# Patient Record
Sex: MALE | Race: WHITE
[De-identification: names, ages, dates, MRNs, and addresses within clinical notes are randomized per-mention and may not be internally consistent; named-entity substitution may affect disease eponyms.]

---

## 2020-05-20 ENCOUNTER — HOSPITAL ENCOUNTER (EMERGENCY)
Dept: HOSPITAL 75 - ER FS | Age: 25
Discharge: HOME | End: 2020-05-20
Payer: SELF-PAY

## 2020-05-20 VITALS — BODY MASS INDEX: 29.22 KG/M2 | WEIGHT: 220.46 LBS | HEIGHT: 72.99 IN

## 2020-05-20 VITALS — SYSTOLIC BLOOD PRESSURE: 127 MMHG | DIASTOLIC BLOOD PRESSURE: 84 MMHG

## 2020-05-20 DIAGNOSIS — L03.011: ICD-10-CM
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
## 2020-05-20 DIAGNOSIS — S60.361A: Primary | ICD-10-CM

## 2020-05-20 PROCEDURE — 99283 EMERGENCY DEPT VISIT LOW MDM: CPT

## 2020-05-20 NOTE — XMS REPORT
Continuity of Care Document

                             Created on: 2020



JENELLE MERCADO

External Reference #: [id number]

: 1995

Sex: Male



Demographics





                          Address                   [address]

[place]  [address]

 

                          Home Phone                [phone] x

 

                          Preferred Language        Unknown

 

                          Marital Status            Unknown

 

                          Worship Affiliation     Unknown

 

                          Race                      Unknown

 

                          Ethnic Group              Unknown





Author





                          Organization              Unknown

 

                          Address                   Unknown

 

                          Phone                     Unavailable



              



Allergies

      



There is no data.                  



Medications

      



There is no data.                  



Problems

      



There is no data.                  



Procedures

      



There is no data.                  



Results

      



There is no data.              



Encounters

      



                ACCT No.           Visit Date/Time           Discharge          

 Status         

             Pt. Type           Provider           Facility           Loc./Unit 

          

Complaint        

 

                    [id number]           2020 19:06:00           

020 19:24:00        

                DIS             Emergency           BHARATH MORALES, LIBAN MURPHY        

   [facility]           ER FS                     SPIDER BITE ON HAND

## 2020-05-20 NOTE — ED GENERAL
General


Chief Complaint:  Bite-Animal/Human/Insect


Stated Complaint:  SPIDER BITE ON HAND


Source of Information:  Patient, RN/MD


Exam Limitations:  No Limitations





History of Present Illness


Date Seen by Provider:  May 20, 2020


Time Seen by Provider:  19:00


Initial Comments


This patient is a 24-year-old male presents to the emergency department for an 

insect bite at the base of the right thumb. Patient states been affected swollen

have drainage for the past couple hours. Patient states seems to be getting 

worse. Patient denies fever.


Timing/Duration:  3-4 Days


Severity:  Mild


Associated Systoms:  No Fever/Chills





Allergies and Home Medications


Patient Home Medication List


Home Medication List Reviewed:  Yes





Review of Systems


Review of Systems


Constitutional:  No no symptoms reported; see HPI; No chills, No diaphoresis, No

dizziness, No fever, No malaise, No weakness, No weight gain, No weight loss, No

other


EENTM:  No see HPI, No no symptoms reported, No ear discharge, No hearing loss, 

No ear pain, No blurred vision, No double vision, No eye pain, No tearing, No 

vision loss, No dental problems, No hoarseness, No mouth pain, No mouth 

swelling, No epistaxis, No nose congestion, No nose pain, No throat pain, No 

throat swelling, No other


Respiratory:  No no symptoms reported, No see HPI, No cough, No dyspnea on 

exertion, No hemoptysis, No orthopnea, No phlegm, No short of breath, No 

stridor, No wheezing, No other


Cardiovascular:  No no symptoms reported, No see HPI, No chest pain, No edema, 

No Hx of Intervention, No palpitations, No syncope, No vascular heart diseas, No

other


Gastrointestinal:  No RUQ, No LUQ, No RLQ, No LLQ, No no symptoms reported, No 

see HPI, No abdominal pain, No constipation, No diarrhea, No dysphagia, No 

hematemesis, No heartburn, No jaundice, No loss of appetite, No melena, No 

nausea, No vomiting, No other


Genitourinary:  No no symptoms reported, No see HPI, No decreased output, No 

discharge, No dysuria, No frequency, No hematuria, No hesitancy, No inc

ontinence, No nocturia, No pain, No other


Musculoskeletal:  No no symptoms reported, No see HPI, No back pain, No gout, No

joint pain, No joint swelling, No muscle pain, No muscle stiffness, No muscle 

cramps, No muscle twitching, No muscle weakness, No neck pain, No other


Skin:  No no symptoms reported; see HPI, change in color; No change in 

hair/nails, No dryness, No hx of skin cancer, No lesions, No lumps, No pruritus,

No rash, No other


Psychiatric/Neurological:  Denies No Symptoms Reported, Denies See HPI, Denies 

Anxiety, Denies Depressed, Denies Emotional Problems, Denies Headache, Denies 

Numbness, Denies Paresthesia, Denies Pre-Existing Deficit, Denies Seizure, 

Denies Tingling, Denies Tremors, Denies Weakness, Denies Other





All Other Systems Reviewed


Negative Unless Noted:  Yes





Past Medical-Social-Family Hx


Patient Social History


Recent Foreign Travel:  No


Contact w/Someone Who Travel:  No





Physical Exam


Vital Signs


Capillary Refill :


Height, Weight, BMI


Height: '"


Weight: lbs. oz. kg;  BMI


Method:


General Appearance:  No Apparent Distress, WD/WN


HEENT:  PERRL/EOMI, TMs Normal, Normal ENT Inspection, Pharynx Normal


Neck:  Full Range of Motion, Normal Inspection, Non Tender, Supple, Carotid 

Bruit


Respiratory:  Chest Non Tender, Lungs Clear, Normal Breath Sounds, No Accessory 

Muscle Use, No Respiratory Distress


Cardiovascular:  Regular Rate, Rhythm, No Edema, No Gallop, No JVD, No Murmur, 

Normal Peripheral Pulses


Extremity:  Inflammation, Swelling (the base of the right thumb. Patient has a 

infected insect bite with mild cellulitis.)





Progress/Results/Core Measures


Suspected Sepsis


SIRS


Temperature: 


Pulse:  


Respiratory Rate: 


 


Blood Pressure  / 


Mean:





Results/Orders


Vital Signs/I&O


Capillary Refill :


Progress Note :  


   Time:  19:13


Progress Note


Soap in Epsom salts and water twice daily for at least 30 minutes. Used Delmar

about appointment as instructed. Finish all antibodies. Ice as needed Tylenol 

Motrin as needed. Follow-up with PCP in 2-3 days.





Departure


Impression





   Primary Impression:  


   Insect bites


   Additional Impression:  


   Cellulitis


Disposition:  01 HOME, SELF-CARE


Condition:  Stable





Departure-Patient Inst.


Decision time for Depature:  19:14


Referrals:  


NO,LOCAL PHYSICIAN (PCP)


Primary Care Physician


Patient Instructions:  Cellulitis (Skin Infection), Adult (DC)





Add. Discharge Instructions:  


Soap in Epsom salts and water twice daily for at least 30 minutes. Used Triple 

Antibiotic appointment as instructed. Finish all antibodies. Ice as needed 

Tylenol Motrin as needed. Follow-up with PCP in 2-3 days.





All discharge instructions reviewed with patient and/or family. Voiced unders

tanding.


Scripts


Sulfamethoxazole/Trimethoprim (Bactrim Ds Tablet) 1 Each Tablet


1 EACH PO BID for 10 Days, #20 TAB


   Prov: LIBAN SINHA MD         5/20/20











LIBAN SINHA MD            May 20, 2020 19:15

## 2020-05-23 ENCOUNTER — HOSPITAL ENCOUNTER (EMERGENCY)
Dept: HOSPITAL 75 - ER FS | Age: 25
Discharge: ANOTHER HOSPITAL | End: 2020-05-23
Payer: SELF-PAY

## 2020-05-23 VITALS — DIASTOLIC BLOOD PRESSURE: 83 MMHG | SYSTOLIC BLOOD PRESSURE: 144 MMHG

## 2020-05-23 DIAGNOSIS — T81.41XA: Primary | ICD-10-CM

## 2020-05-23 LAB
ALBUMIN SERPL-MCNC: 4.8 GM/DL (ref 3.2–4.5)
ALP SERPL-CCNC: 68 U/L (ref 40–136)
ALT SERPL-CCNC: 17 U/L (ref 0–55)
BASOPHILS # BLD AUTO: 0 10^3/UL (ref 0–0.1)
BASOPHILS NFR BLD AUTO: 0 % (ref 0–10)
BILIRUB SERPL-MCNC: 0.7 MG/DL (ref 0.1–1)
BUN/CREAT SERPL: 12
CALCIUM SERPL-MCNC: 9.8 MG/DL (ref 8.5–10.1)
CHLORIDE SERPL-SCNC: 98 MMOL/L (ref 98–107)
CO2 SERPL-SCNC: 26 MMOL/L (ref 21–32)
CREAT SERPL-MCNC: 0.82 MG/DL (ref 0.6–1.3)
EOSINOPHIL # BLD AUTO: 0 10^3/UL (ref 0–0.3)
EOSINOPHIL NFR BLD AUTO: 0 % (ref 0–10)
ERYTHROCYTE [DISTWIDTH] IN BLOOD BY AUTOMATED COUNT: 11.9 % (ref 10–14.5)
GFR SERPLBLD BASED ON 1.73 SQ M-ARVRAT: > 60 ML/MIN
GLUCOSE SERPL-MCNC: 97 MG/DL (ref 70–105)
HCT VFR BLD CALC: 43 % (ref 40–54)
HGB BLD-MCNC: 15.1 G/DL (ref 13.3–17.7)
LYMPHOCYTES # BLD AUTO: 1.7 X 10^3 (ref 1–4)
LYMPHOCYTES NFR BLD AUTO: 11 % (ref 12–44)
MANUAL DIFFERENTIAL PERFORMED BLD QL: YES
MCH RBC QN AUTO: 31 PG (ref 25–34)
MCHC RBC AUTO-ENTMCNC: 35 G/DL (ref 32–36)
MCV RBC AUTO: 88 FL (ref 80–99)
MONOCYTES # BLD AUTO: 1.3 X 10^3 (ref 0–1)
MONOCYTES NFR BLD AUTO: 8 % (ref 0–12)
MONOCYTES NFR BLD: 5 %
NEUTROPHILS # BLD AUTO: 12.6 X 10^3 (ref 1.8–7.8)
NEUTROPHILS NFR BLD AUTO: 80 % (ref 42–75)
NEUTS BAND NFR BLD MANUAL: 83 %
PLATELET # BLD: 257 10^3/UL (ref 130–400)
PMV BLD AUTO: 9.7 FL (ref 7.4–10.4)
POTASSIUM SERPL-SCNC: 3.4 MMOL/L (ref 3.6–5)
PROT SERPL-MCNC: 8.1 GM/DL (ref 6.4–8.2)
SODIUM SERPL-SCNC: 138 MMOL/L (ref 135–145)
VARIANT LYMPHS NFR BLD MANUAL: 12 %
WBC # BLD AUTO: 15.8 10^3/UL (ref 4.3–11)

## 2020-05-23 PROCEDURE — 73130 X-RAY EXAM OF HAND: CPT

## 2020-05-23 PROCEDURE — 87205 SMEAR GRAM STAIN: CPT

## 2020-05-23 PROCEDURE — 87077 CULTURE AEROBIC IDENTIFY: CPT

## 2020-05-23 PROCEDURE — 87070 CULTURE OTHR SPECIMN AEROBIC: CPT

## 2020-05-23 PROCEDURE — 85027 COMPLETE CBC AUTOMATED: CPT

## 2020-05-23 PROCEDURE — 87186 SC STD MICRODIL/AGAR DIL: CPT

## 2020-05-23 PROCEDURE — 36415 COLL VENOUS BLD VENIPUNCTURE: CPT

## 2020-05-23 PROCEDURE — 83605 ASSAY OF LACTIC ACID: CPT

## 2020-05-23 PROCEDURE — 87040 BLOOD CULTURE FOR BACTERIA: CPT

## 2020-05-23 PROCEDURE — 80053 COMPREHEN METABOLIC PANEL: CPT

## 2020-05-23 PROCEDURE — 85007 BL SMEAR W/DIFF WBC COUNT: CPT

## 2020-05-23 NOTE — DIAGNOSTIC IMAGING REPORT
INDICATION:  Infected swollen painful hand



TECHNIQUE:  Three views of the right hand.



CORRELATION STUDY:  None



FINDINGS: 

There is normal alignment and appearance of the osseous

structures of the hand. The joint spaces are maintained. There is

no acute fracture.   Generalized soft tissue edema over the hand

and distal forearm.



IMPRESSION: 

1. Negative for acute bony abnormality of the hand.  Generalized

soft tissue edema.



Dictated by: 



  Dictated on workstation # [id number]
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
## 2020-09-06 ENCOUNTER — HOSPITAL ENCOUNTER (EMERGENCY)
Dept: HOSPITAL 75 - ER FS | Age: 25
Discharge: HOME | End: 2020-09-06
Payer: SELF-PAY

## 2020-09-06 VITALS — DIASTOLIC BLOOD PRESSURE: 72 MMHG | SYSTOLIC BLOOD PRESSURE: 131 MMHG

## 2020-09-06 VITALS — WEIGHT: 191.8 LBS | HEIGHT: 72.83 IN | BODY MASS INDEX: 25.42 KG/M2

## 2020-09-06 DIAGNOSIS — L03.116: Primary | ICD-10-CM

## 2020-09-06 PROCEDURE — 99284 EMERGENCY DEPT VISIT MOD MDM: CPT

## 2020-09-06 NOTE — ED GENERAL
General


Stated Complaint:  INSECT BITE ON LT LEG





History of Present Illness


Date Seen by Provider:  Sep 6, 2020


Time Seen by Provider:  16:15


Initial Comments


The patient is a 24-year-old male who is otherwise healthy. He presents with 

concern for an erythematous tender lesion over his left lateral hip at the level

of the greater trochanter. He states he awoke with this 2 days ago and it has 

been getting bigger ever since. He states he tried to cut into it to "drain it" 

but got nothing out. He denies associated fevers, nausea or vomiting, abdominal 

pain, flank pain, back pain, groin pain, pain with movement of his left hip. He 

ambulated in with a narrow, steady gait. No therapy prior to arrival.





Allergies and Home Medications


Allergies


Coded Allergies:  


     No Known Drug Allergies (Unverified , 5/20/20)





Home Medications


Sulfamethoxazole/Trimethoprim 1 Each Tablet, 1 EACH PO BID


   Prescribed by: LIBAN SINHA on 5/20/20 1915





Patient Home Medication List


Home Medication List Reviewed:  Yes





Review of Systems


Review of Systems


Constitutional:  see HPI





All Other Systems Reviewed


Negative Unless Noted:  Yes (Negative excepted noted.)





Past Medical-Social-Family Hx


Past Med/Social Hx:  Reviewed Nursing Past Med/Soc Hx


Patient Social History


2nd Hand Smoke Exposure:  No


Recent Hopitalizations:  No





Seasonal Allergies


Seasonal Allergies:  No





Past Medical History


Surgeries:  No


Respiratory:  No


Cardiac:  No


Neurological:  No


Genitourinary:  No


Gastrointestinal:  No


Musculoskeletal:  No


Endocrine:  No


HEENT:  No


Cancer:  No


Psychosocial:  No


Integumentary:  No


Blood Disorders:  No





Family Medical History


Reviewed Nursing Family Hx





Physical Exam


Vital Signs


Capillary Refill :


Height, Weight, BMI


Height: '"


Weight: lbs. oz. kg; 29.00 BMI


Method:


General Appearance:  No Apparent Distress


Comments


This is a young  male appearing nontoxic and in no acute distress. Head

is normocephalic and atraumatic. Neck is supple and nontender. Oropharynx is 

moist. Lungs are clear to auscultation at all stations. There is a normal S1 and

S2 without rubs or gallops and capillary refill is appropriate, less than 2 

seconds globally. Abdomen is soft, nontender and nondistended. Skin is warm and 

dry without cyanosis, clubbing or edema. Psychiatrically, the patient didn't 

straights appropriate mood and affect and is alert. From a musculoskeletal st

andpoint, evaluation of the left lower extremity is remarkable for an 

approximately 3 cm raised erythematous mildly tender lesion without fluctuance 

noted to the upper left lateral thigh over the greater trochanter. No pain with 

ranging of any joint of the left lower extremity. The left lower cavity is 

neurovascularly intact distally with strength 5 out of 5, sensation intact to li

ght touch in all nerve distortions, DP and PT pulses 2+, capillary refill less 

than 2 seconds, 4 warm and well-perfused.





Progress/Results/Core Measures


Suspected Sepsis


SIRS


Temperature: 


Pulse:  


Respiratory Rate: 


 


Blood Pressure  / 


Mean:





Results/Orders


My Orders





Orders - MAGALIS JOHNSON MD


Cephalexin Capsule (Keflex Capsule) (9/6/20 17:30)


Sulfamethoxazole/Trimet Ds Tab (Bactrim (9/6/20 17:30)


Ibuprofen  Tablet (Motrin  Tablet) (9/6/20 17:30)





Vital Signs/I&O


Capillary Refill :


Progress Note :  


   Time:  17:21


Progress Note


Bedside soft tissue ultrasound utilized to evaluate the apparent focal 

cellulitis versus abscess noted to the patient's left hip. This shows 

cobblestoning but no focal fluid collection amenable to abscess I&D. No joint 

irritability seen; no evidence of any joint space involvement. Left lower 

extremity is neurovascularly intact. No history of IV drug use. We'll place on 

Keflex and Bactrim and discharge with strict return precautions for worsening 

symptoms of any kind. We'll prescribe pain medication. All questions are 

answered.





Departure


Impression





   Primary Impression:  


   Cellulitis of left hip


Disposition:  01 HOME, SELF-CARE


Condition:  Improved





Departure-Patient Inst.


Referrals:  


HENRIETTA ROMO MD


Patient Instructions:  Cellulitis and Erysipelas (Skin Infections)





Add. Discharge Instructions:  


Follow-up very closely with your primary care physician; we are referring you to

see Dr. self. Please call to make an appointment immediately after the long 

weekend. Take the antibiotics until they are gone. Take ibuprofen every 8 hours 

as needed for pain, with food to prevent stomach upset. You may take a Norco 

pill every 6 hours as needed for pain that is not well controlled with ibuprofen

alone. Return to the emergency department right away with worsening symptoms of 

any kind or with any other new symptoms of concern.


Scripts


Hydrocodone/Acetaminophen (Hydrocodone-Acetamin 5-325 mg) 1 Each Tablet


1 EACH PO Q6H for Breakthrough Pain, #10 TAB


   Prov: MAGALIS JOHNSON MD         9/6/20 


Ibuprofen (Ibuprofen) 800 Mg Tablet


800 MG PO Q8H PRN for PAIN, #30 TAB 0 Refills


   Prov: MAGALIS JOHNSON MD         9/6/20 


Sulfamethoxazole/Trimethoprim (Bactrim Ds Tablet) 1 Each Tablet


1 EACH PO BID for 10 Days, #20 TAB


   Prov: MAGALIS JOHNSON MD         9/6/20 


Cephalexin (Keflex) 500 Mg Capsule


500 MG PO Q6H for 10 Days, #40 CAP


   Prov: MAGALIS JOHNSON MD         9/6/20











MAGALIS JOHNSON MD               Sep 6, 2020 17:23

## 2023-06-29 ENCOUNTER — HOSPITAL ENCOUNTER (OUTPATIENT)
Dept: HOSPITAL 75 - ER FS | Age: 28
Discharge: HOME | End: 2023-06-29
Attending: SURGERY
Payer: SELF-PAY

## 2023-06-29 VITALS — SYSTOLIC BLOOD PRESSURE: 160 MMHG | DIASTOLIC BLOOD PRESSURE: 97 MMHG

## 2023-06-29 VITALS — WEIGHT: 224.87 LBS | HEIGHT: 71.65 IN | BODY MASS INDEX: 30.79 KG/M2

## 2023-06-29 VITALS — SYSTOLIC BLOOD PRESSURE: 150 MMHG | DIASTOLIC BLOOD PRESSURE: 96 MMHG

## 2023-06-29 VITALS — SYSTOLIC BLOOD PRESSURE: 167 MMHG | DIASTOLIC BLOOD PRESSURE: 104 MMHG

## 2023-06-29 VITALS — SYSTOLIC BLOOD PRESSURE: 168 MMHG | DIASTOLIC BLOOD PRESSURE: 93 MMHG

## 2023-06-29 VITALS — SYSTOLIC BLOOD PRESSURE: 155 MMHG | DIASTOLIC BLOOD PRESSURE: 97 MMHG

## 2023-06-29 VITALS — DIASTOLIC BLOOD PRESSURE: 91 MMHG | SYSTOLIC BLOOD PRESSURE: 137 MMHG

## 2023-06-29 VITALS — SYSTOLIC BLOOD PRESSURE: 165 MMHG | DIASTOLIC BLOOD PRESSURE: 105 MMHG

## 2023-06-29 VITALS — DIASTOLIC BLOOD PRESSURE: 98 MMHG | SYSTOLIC BLOOD PRESSURE: 162 MMHG

## 2023-06-29 DIAGNOSIS — K35.32: Primary | ICD-10-CM

## 2023-06-29 LAB
ALBUMIN SERPL-MCNC: 4.4 GM/DL (ref 3.2–4.5)
ALP SERPL-CCNC: 112 U/L (ref 40–136)
ALT SERPL-CCNC: 82 U/L (ref 0–55)
APTT PPP: (no result) S
BACTERIA #/AREA URNS HPF: NEGATIVE /HPF
BASOPHILS # BLD AUTO: 0 10^3/UL (ref 0–0.1)
BASOPHILS NFR BLD AUTO: 0 % (ref 0–10)
BILIRUB SERPL-MCNC: 0.8 MG/DL (ref 0.1–1)
BILIRUB UR QL STRIP: (no result)
BUN/CREAT SERPL: 13
CALCIUM SERPL-MCNC: 9.8 MG/DL (ref 8.5–10.1)
CHLORIDE SERPL-SCNC: 98 MMOL/L (ref 98–107)
CO2 SERPL-SCNC: 26 MMOL/L (ref 21–32)
CREAT SERPL-MCNC: 0.94 MG/DL (ref 0.6–1.3)
EOSINOPHIL # BLD AUTO: 0.1 10^3/UL (ref 0–0.3)
EOSINOPHIL NFR BLD AUTO: 1 % (ref 0–10)
FIBRINOGEN PPP-MCNC: CLEAR MG/DL
GFR SERPLBLD BASED ON 1.73 SQ M-ARVRAT: 114 ML/MIN
GLUCOSE SERPL-MCNC: 113 MG/DL (ref 70–105)
GLUCOSE UR STRIP-MCNC: NEGATIVE MG/DL
HCT VFR BLD CALC: 43 % (ref 40–54)
HGB BLD-MCNC: 14.8 G/DL (ref 13.3–17.7)
KETONES UR QL STRIP: NEGATIVE
LEUKOCYTE ESTERASE UR QL STRIP: NEGATIVE
LIPASE SERPL-CCNC: 22 U/L (ref 8–78)
LYMPHOCYTES # BLD AUTO: 1.3 10^3/UL (ref 1–4)
LYMPHOCYTES NFR BLD AUTO: 11 % (ref 12–44)
MANUAL DIFFERENTIAL PERFORMED BLD QL: NO
MCH RBC QN AUTO: 30 PG (ref 25–34)
MCHC RBC AUTO-ENTMCNC: 34 G/DL (ref 32–36)
MCV RBC AUTO: 89 FL (ref 80–99)
MONOCYTES # BLD AUTO: 0.9 10^3/UL (ref 0–1)
MONOCYTES NFR BLD AUTO: 8 % (ref 0–12)
NEUTROPHILS # BLD AUTO: 9.8 10^3/UL (ref 1.8–7.8)
NEUTROPHILS NFR BLD AUTO: 81 % (ref 42–75)
NITRITE UR QL STRIP: NEGATIVE
PH UR STRIP: 6 [PH] (ref 5–9)
PLATELET # BLD: 292 10^3/UL (ref 130–400)
PMV BLD AUTO: 9.7 FL (ref 9–12.2)
POTASSIUM SERPL-SCNC: 3.9 MMOL/L (ref 3.6–5)
PROT SERPL-MCNC: 8.2 GM/DL (ref 6.4–8.2)
PROT UR QL STRIP: (no result)
RBC #/AREA URNS HPF: (no result) /HPF
SODIUM SERPL-SCNC: 135 MMOL/L (ref 135–145)
SP GR UR STRIP: 1.02 (ref 1.02–1.02)
SQUAMOUS #/AREA URNS HPF: (no result) /HPF
WBC # BLD AUTO: 12.1 10^3/UL (ref 4.3–11)
WBC #/AREA URNS HPF: (no result) /HPF

## 2023-06-29 PROCEDURE — 80053 COMPREHEN METABOLIC PANEL: CPT

## 2023-06-29 PROCEDURE — 81000 URINALYSIS NONAUTO W/SCOPE: CPT

## 2023-06-29 PROCEDURE — 85025 COMPLETE CBC W/AUTO DIFF WBC: CPT

## 2023-06-29 PROCEDURE — 83690 ASSAY OF LIPASE: CPT

## 2023-06-29 PROCEDURE — 87081 CULTURE SCREEN ONLY: CPT

## 2023-06-29 PROCEDURE — 36415 COLL VENOUS BLD VENIPUNCTURE: CPT

## 2023-06-29 PROCEDURE — 74177 CT ABD & PELVIS W/CONTRAST: CPT

## 2023-06-29 NOTE — DISCHARGE INST-SURGICAL
Discharge Inst-Surgical


Depart Medication/Instructions


New, Converted or Re-Newed RX:  Transmitted to Pharmacy


Patient Instructions


Follow up Appt:


Make appointment for 1 week. [phone]





Instructions:


No lifting greater than 20 pounds.


No strenuous activity. 


May shower in 24 hours, no tub bath or soaking.


Use incentive spirometer at home as directed.


No Smoking





Skin/Wound Care:


May remove bandages in am.  You need to leave the Dermabond on incision it will 

fall off on it's own. 





Symptoms to Report:


Appetite Changes, Extremity Discoloration, Numbness/Tingling, Swelling 

Increased, Bleeding Excessive, Eyesight Changes, Pain Increased, Urine Color 

Change, Constipation(Persistent), Fever over 101 degree F, Pain/Pressure in 

chest, Urinating Difficulty, Cough Up/Vomit Blood, Heart Beat Irreg/Pounding, 

Pain/Pressure in jaw, Cramps in feet or legs, Lightheadedness, Pain/Pressure in 

shoulder, Diarrhea(Persistent), Memory Changes Suddenly, Questions/Concerns, 

Weight gain consecutive days, Dizziness/Fainting, Nausea/Vomiting, Shortness of 

Breath, Weight gain over 2 pounds








If questions or concerns contact your physician 


Or seek help at emergency department.





Activity


Activity as Tolerated:  Yes


Activity Instructions:  Avoid Stress to Incision


Driving Instructions:  No Driving/Refer to Dr. Zuleta


Discharge Diet:  No Restrictions


Diet After 24 Hours:  Clear Liquid if Nauseous


If Any Problems/Questions/Issu:  Contact Your Physician, Go to Emergency Room





Skin/Wound Care


Infection Signs and Symptoms:  Increased Redness, Foul Odor of Wound, Increased 

Drainage, Skin Itchy or Has a Rash, Increased Swelling, Temperature Above 101  F


Wound Care Comment:  


Heating pad to shoulder or neck tonight for pain.  YULISSA bulb teaching


Bathing Instructions:  Shower


Stitches/Staples/Dermabond Dis:  Dermabond











SHREYAS BAEZ DO               Jun 29, 2023 19:36

## 2023-06-29 NOTE — DIAGNOSTIC IMAGING REPORT
PROCEDURE: CT abdomen and pelvis with contrast.



TECHNIQUE: Multiple contiguous axial images were obtained through

the abdomen and pelvis after administration of intravenous

contrast. Auto Exposure Controls were utilized during the CT exam

to meet ALARA standards for radiation dose reduction. All CT

scans use one or more of the following dose optimizing

techniques: automated exposure control, MA and/or KvP adjustment

based on patient size and exam type or iterative reconstruction.



INDICATION: Right lower quadrant abdominal pain.



FINDINGS: No focal hepatic, gallbladder, pancreatic, adrenal

gland or splenic abnormalities identified. Kidneys are also

unremarkable.



There is extensive dilatation and mural thickening of the

appendix located in a retrocecal location. Surrounding edema and

inflammation is also noted without evidence of organized fluid

collection. Otherwise, no focal inflammation or organized fluid

collection is seen within the abdomen or pelvis. There is

exostosis along the lateral aspect of the anterosuperior right

iliac bone without CT evidence of cartilaginous thickening. This

measures up to approximately 3.2 cm in diameter.



IMPRESSION: Findings are compatible with acute appendicitis with

periappendiceal inflammation. No drainable abscess is identified.



Incidental note is made of 3.2 cm external osteochondroma along

the anterosuperior right iliac bone.



Dictated by: 



  Dictated on workstation # [id number]

## 2023-06-29 NOTE — PROGRESS NOTE-POST OPERATIVE
Post-Operative Progess Note


Surgeon (s)/Assistant (s)


Surgeon


SHREYAS BAEZ DO


Assistant:  MISTY Valadez





Pre-Operative Diagnosis


Appendicitis





Post-Operative Diagnosis





Perforated Appendicitis





Procedure & Operative Findings


Date of Procedure


6/29/23


Procedure Performed/Findings


PROCEDURE: Laparoscopic appendectomy. 


 


COMPLICATIONS: None. 


 


INDICATIONS:


The patient is a 27 year old male who has been having right lower


quadrant abdominal pain. Patient's exam consistent with appendicitis.


I discussed risk and benefits of laparoscopic appendectomy 


and all indicated procedures with the possibility being a normal appendix. 


The patient understands the risks and benefits and wishes to proceed. 


Consent was signed on the chart. 


 


DESCRIPTION OF PROCEDURE:


The patient was taken to the operating suite, prepped and draped in a sterile fa

shion.  Timeout was 


performed.  Local anesthetic was infiltrated just above the umbilicus and 11-

blade scalpel was used 


to make a skin incision.  Cautery was used to dissect down to the fascia and 

scored.  Kochers were 


used to grasp and elevate it and the abdomen was then entered.  A 0 Vicryl was 

placed in a figure-of-


eight fashion for closure at the end of the case.  The balloon trocar was 

inserted into the abdomen and 


pneumoperitoneum was achieved.  Under direct visualization of the laparoscope, a

5 mm trocar was 


placed in the suprapubic region and a 5 mm trocar was placed in the left lower 

quadrant.  Appendix was


located, behind the cecum and up more in the right upper quadrant.  Started 

gently but bluntly dissecting


it off the wall and immediately got some purulent fluid out.  It was very stuck 

and had a hard time getting


it free.  Actually had to place another 5mm port in the normal fashion in the 

right upper quadrant. Then


able to get around the base of the appendix right at the cecum.  Used an Endo-

SAURABH 2.5 stapler to fire 


across the base of the appendix.  The mesoappendix was then divided with the 

ligasure and more blunt


dissection as well as hydrodissection.  Finally able to get it completely free. 

It was then placed in an 


Endobag and removed through the 12 mm trocar site.  The abdomen was then 

irrigated and suctioned.  


Elected to place a 19French mavis drain into the abdomen and in the right 

paracolic gutter, brought out 


through the suprapubic port site and sutured in place with 3-0 nylon.  No other 

pathology noted.  The 


abdomen was then desufflated and the trocars were removed.  The 0 Vicryl placed 

at the beginning of the 


case was then tied closing the 12 mm fascial defect.  The skin was then closed 

using 4-0 Monocryl in a 


subcuticular fashion.  The abdomen was then washed and dried and Skin Affix was 

placed over the incisions.  


The mavis drain was hooked up to bulb suction.  Patient tolerated the procedure 

well without any comp-


lications and was taken to the recovery room in stable condition.


Anesthesia Type


GET





Estimated Blood Loss


Estimated blood loss (mL):  minimal





Specimens/Packing


Specimens Removed


appendix











SHREYAS BAEZ DO               Jun 29, 2023 19:33

## 2023-06-29 NOTE — ED ABDOMINAL PAIN
General


Chief Complaint:  Abdominal/GI Problems


Stated Complaint:  ABD PAIN; FEVER


Nursing Triage Note:  


Pt sent from [facility] for further evaluation and treatment of RLQ abdominal pain. Pt 


reports onset of pain approximately three days ago. Reports nausea/vomiting x1 


last night and night sweats. Pt denies complaints of diarrhea


Source of Information:  Patient


Exam Limitations:  No Limitations





History of Present Illness


Date Seen by Provider:  Jun 29, 2023


Time Seen by Provider:  14:08


Initial Comments


27-year-old male presents emergency department today for abdominal pain.  

Symptoms present for the last 2 or 3 days and have been constant.  He points to 

his right mid abdomen as the source of the pain.  No obvious aggravating or 

alleviating factors and no radiation.  He does endorse has had night sweats last

couple nights and thinks he may have had fevers but did not actually check his 

temperature.  He did vomit once today and he has had decreased appetite for the 

last couple of days.  He has had normal bowel movements and no urinary symptoms.

 No testicular or groin symptoms.  He was sent here from the [facility] clinic and I 

received verbal report prior to his arrival from the provider there.





All other systems reviewed and negative except documented per HPI.





Voice recognition software was used to help create this chart





Allergies and Home Medications


Allergies


Coded Allergies:  


     No Known Drug Allergies (Unverified , 5/20/20)





Patient Home Medication List


Home Medication List Reviewed:  Yes


Cephalexin (Keflex) 500 Mg Capsule, 500 MG PO Q6H


   Prescribed by: MAGALIS JOHNSON on 9/6/20 1728


Hydrocodone/Acetaminophen (Hydrocodone-Acetamin 5-325 mg) 1 Each Tablet, 1 EACH 

PO Q6H


   Prescribed by: MAGALIS JOHNSON on 9/6/20 1728


Ibuprofen (Ibuprofen) 800 Mg Tablet, 800 MG PO Q8H PRN for PAIN


   Prescribed by: MAGALIS JOHNSON on 9/6/20 1728


Sulfamethoxazole/Trimethoprim (Bactrim Ds Tablet) 1 Each Tablet, 1 EACH PO BID


   Prescribed by: LIBAN SINHA on 5/20/20 1915


Sulfamethoxazole/Trimethoprim (Bactrim Ds Tablet) 1 Each Tablet, 1 EACH PO BID


   Prescribed by: MAGALIS JOHNSON on 9/6/20 1728





Review of Systems


Review of Systems


Constitutional:  see HPI





Past Medical-Social-Family Hx


Patient Social History


Tobacco Use?:  No


Use of E-Cig and/or Vaping dev:  No


Substance use?:  No


Alcohol Use?:  Yes


Alcohol type:  Beer


Alcohol Frequency:  Once in a while





Seasonal Allergies


Seasonal Allergies:  No





Past Medical History


Surgeries:  No


Respiratory:  No


Cardiac:  No


Neurological:  No


Genitourinary:  No


Gastrointestinal:  No


Musculoskeletal:  No


Endocrine:  No


HEENT:  No


Cancer:  No


Psychosocial:  No


Integumentary:  No


Blood Disorders:  No





Physical Exam


Vital Signs





Vital Signs - First Documented








 6/29/23





 14:09


 


Temp 37.0


 


Pulse 88


 


Resp 16


 


B/P (MAP) 154/88 (110)


 


Pulse Ox 98


 


O2 Delivery Room Air





Capillary Refill : Less Than 3 Seconds


Height/Weight/BMI


Height: '"


Weight: lbs. oz. kg; 30.00 BMI


Method:


General Appearance:  WD/WN, no apparent distress


HEENT:  normal ENT inspection, pharynx normal


Neck:  non-tender, supple, normal inspection


Respiratory:  chest non-tender, lungs clear, normal breath sounds, no 

respiratory distress, no accessory muscle use


Cardiovascular:  regular rate, rhythm, no murmur


Gastrointestinal:  normal bowel sounds, soft, no organomegaly, no pulsatile 

mass, tenderness (Tenderness to palpation in right upper and right lower abdomen

with voluntary guarding.  No rebound tenderness.  No mass organomegaly.  No skin

changes.)


Back:  normal inspection, no CVA tenderness


Neurologic/Psychiatric:  alert, normal mood/affect, oriented x 3


Skin:  normal color, warm/dry





Progress/Results/Core Measures


Results/Orders


Lab Results





Laboratory Tests








Test


 6/29/23


14:13 6/29/23


14:20 Range/Units


 


 


White Blood Count


 12.1 H


 


 4.3-11.0


10^3/uL


 


Red Blood Count


 4.88 


 


 4.30-5.52


10^6/uL


 


Hemoglobin 14.8   13.3-17.7  g/dL


 


Hematocrit 43   40-54  %


 


Mean Corpuscular Volume 89   80-99  fL


 


Mean Corpuscular Hemoglobin 30   25-34  pg


 


Mean Corpuscular Hemoglobin


Concent 34 


 


 32-36  g/dL





 


Red Cell Distribution Width 11.7   10.0-14.5  %


 


Platelet Count


 292 


 


 130-400


10^3/uL


 


Mean Platelet Volume 9.7   9.0-12.2  fL


 


Immature Granulocyte % (Auto) 0    %


 


Neutrophils (%) (Auto) 81 H  42-75  %


 


Lymphocytes (%) (Auto) 11 L  12-44  %


 


Monocytes (%) (Auto) 8   0-12  %


 


Eosinophils (%) (Auto) 1   0-10  %


 


Basophils (%) (Auto) 0   0-10  %


 


Neutrophils # (Auto)


 9.8 H


 


 1.8-7.8


10^3/uL


 


Lymphocytes # (Auto)


 1.3 


 


 1.0-4.0


10^3/uL


 


Monocytes # (Auto)


 0.9 


 


 0.0-1.0


10^3/uL


 


Eosinophils # (Auto)


 0.1 


 


 0.0-0.3


10^3/uL


 


Basophils # (Auto)


 0.0 


 


 0.0-0.1


10^3/uL


 


Immature Granulocyte # (Auto)


 0.0 


 


 0.0-0.1


10^3/uL


 


Sodium Level 135   135-145  MMOL/L


 


Potassium Level 3.9   3.6-5.0  MMOL/L


 


Chloride Level 98     MMOL/L


 


Carbon Dioxide Level 26   21-32  MMOL/L


 


Anion Gap 11   5-14  MMOL/L


 


Blood Urea Nitrogen 12   7-18  MG/DL


 


Creatinine


 0.94 


 


 0.60-1.30


MG/DL


 


Estimat Glomerular Filtration


Rate 114 


 


  





 


BUN/Creatinine Ratio 13    


 


Glucose Level 113 H    MG/DL


 


Calcium Level 9.8   8.5-10.1  MG/DL


 


Corrected Calcium 9.5   8.5-10.1  MG/DL


 


Total Bilirubin 0.8   0.1-1.0  MG/DL


 


Aspartate Amino Transf


(AST/SGOT) 57 H


 


 5-34  U/L





 


Alanine Aminotransferase


(ALT/SGPT) 82 H


 


 0-55  U/L





 


Alkaline Phosphatase 112     U/L


 


Total Protein 8.2   6.4-8.2  GM/DL


 


Albumin 4.4   3.2-4.5  GM/DL


 


Lipase 22   8-78  U/L


 


Urine Color  DARK YELLOW   


 


Urine Clarity  CLEAR   


 


Urine pH  6.0  5-9  


 


Urine Specific Gravity  1.025 H 1.016-1.022  


 


Urine Protein  TRACE H NEGATIVE  


 


Urine Glucose (UA)  NEGATIVE  NEGATIVE  


 


Urine Ketones  NEGATIVE  NEGATIVE  


 


Urine Nitrite  NEGATIVE  NEGATIVE  


 


Urine Bilirubin  1+ H NEGATIVE  


 


Urine Urobilinogen  2.0  < = 1.0  MG/DL


 


Urine Leukocyte Esterase  NEGATIVE  NEGATIVE  


 


Urine RBC (Auto)  NEGATIVE  NEGATIVE  


 


Urine RBC  NONE   /HPF


 


Urine WBC  RARE   /HPF


 


Urine Squamous Epithelial


Cells 


 RARE 


  /HPF





 


Urine Crystals  NONE   /LPF


 


Urine Bacteria  NEGATIVE   /HPF


 


Urine Casts  NONE   /LPF


 


Urine Mucus  SMALL H  /LPF


 


Urine Culture Indicated  NO   








My Orders





Orders - ROSS NAJERA DO


Cbc With Automated Diff (6/29/23 14:14)


Comprehensive Metabolic Panel (6/29/23 14:14)


Lipase (6/29/23 14:14)


Ua Culture If Indicated (6/29/23 14:14)


Ct Abdomen/Pelvis W (6/29/23 14:14)


Iohexol Injection (Omnipaque 350 Mg/Ml 1 (6/29/23 14:30)


Received Contrast (Hold Metformin- Contr (6/29/23 14:30)


Ns (Ivpb) (Sodium Chloride 0.9% Ivpb Bag (6/29/23 14:30)





Medications Given in ED





Current Medications








 Medications  Dose


 Ordered  Sig/Herbert


 Route  Start Time


 Stop Time Status Last Admin


Dose Admin


 


 Iohexol  100 ml  ONCE  ONCE


 IV  6/29/23 14:30


 6/29/23 14:31 DC 6/29/23 14:43


80 ML


 


 Sodium Chloride  100 ml  ONCE  ONCE


 IV  6/29/23 14:30


 6/29/23 14:31 DC 6/29/23 14:43


100 ML








Vital Signs/I&O











 6/29/23 6/29/23 6/29/23





 14:09 14:29 14:45


 


Temp 37.0  


 


Pulse 88 86 85


 


Resp 16 16 16


 


B/P (MAP) 154/88 (110) 137/74 (95) 145/94 (111)


 


Pulse Ox 98 96 98


 


O2 Delivery Room Air Room Air Room Air














Blood Pressure Mean:                    110











Departure


Communication (Admissions)


The patient is hemodynamically stable.  No evidence for sepsis.  He has mild 

leukocytosis.  CT scan on my independent review shows likely perforated 

appendicitis with small abscess.  I spoke to Dr. Trimble who accepts the patient 

in transfer to [facility] in [place].  When speaking to the patient he states

he has some things he needs to take care of prior to being transferred and 

declines ambulance transfer stating he needs to stop by some job sites first.  I

advised against this as his infection could be life-threatening.  He states 

understanding but again refuses ambulance transport.  I spoke with Dr. Trimble he

is comfortable with the patient driving but requests he come now so that he can 

perform an appendectomy emergently upon his arrival.  I relayed this to the 

patient he states understanding.  I advise he not eat or drink anything during 

transport and to drive straight to [address] in [place].





Impression





   Primary Impression:  


   Appendicitis


   Qualified Codes:  K35.33 - Acute appendicitis with perforation, localized 

   peritonitis, and gangrene, with abscess


Disposition:  30 STILL A PATIENT


Condition:  Stable





Departure-Patient Inst.


Referrals:  


NO,LOCAL PHYSICIAN (PCP/Family)


Primary Care Physician


Patient Instructions:  Appendicitis, Adult (DC)











ROSS NAJERA DO            Jun 29, 2023 14:17

## 2023-06-29 NOTE — CONSULTATION - SURGERY
KIANA SHARIF 6/29/23 1630:


History of Present Illness


History of Present Illness


Patient Consulted On(singh/time)


6/29/23


 16:23


Date Seen by Provider:  Jun 29, 2023


Time Seen by Provider:  16:20


Reason for Visit:  Perforated appendix


History of Present Illness


Reji Lackey is a 28 yo male who presents to [facility] for management of right

lower quadrant pain. The patient reports he has experienced right mid and lower 

abdominal pain for the past 2 weeks. He describes the pain as a soreness and sta

marylin it has been nonradiating; it is alleviated by lying flat and exacerbated by 

palpation of the abdomen. The patient further reports accompanying symptoms of 

decreased appetite and intermittent SOB secondary to the pain for the past 2 

weeks, as well as subjective fever and diaphoresis for the past 2 nights, and 

nausea with one episode of vomiting last night. The patient presented to [facility] 

walk-in care for removal of stitches today and noted this complaint to the 

provider, who ultimately sent the patient to the [facility], where a CT scan 

was performed with results concerning for perforated appendix, prompting [facility] to instruct the patient to present here. [facility] ED workup was also 

remarkable for leukocytosis of 12.1, AST 57, ALT 82, urinalysis containing 1+ 

bilirubin. The patient denies any recent chest pain or urinary symptoms. The 

patient remarks he last ate at 1300.





Allergies and Home Medications


Allergies


Coded Allergies:  


     No Known Drug Allergies (Unverified , 5/20/20)





Patient Home Medication List


Home Medication List Reviewed:  Yes


Cephalexin (Keflex) 500 Mg Capsule, 500 MG PO Q6H


   Prescribed by: MAGALIS JOHNSON on 9/6/20 1728


Hydrocodone/Acetaminophen (Hydrocodone-Acetamin 5-325 mg) 1 Each Tablet, 1 EACH 

PO Q6H


   Prescribed by: MAGALIS JOHNSON on 9/6/20 1728


Ibuprofen (Ibuprofen) 800 Mg Tablet, 800 MG PO Q8H PRN for PAIN


   Prescribed by: MAGALIS JOHNSON on 9/6/20 1728


Sulfamethoxazole/Trimethoprim (Bactrim Ds Tablet) 1 Each Tablet, 1 EACH PO BID


   Prescribed by: LIBAN SINHA on 5/20/20 1915


Sulfamethoxazole/Trimethoprim (Bactrim Ds Tablet) 1 Each Tablet, 1 EACH PO BID


   Prescribed by: MAGALIS JOHNSON on 9/6/20 1728





Past Medical-Social-Family Hx


Patient Social History


Smoking Status:  Current Everyday Smoker (0.5 ppd)


Type Used:  Cigarettes


2nd Hand Smoke Exposure:  Yes


Recent Hopitalizations:  No


Alcohol Use?:  Yes


Substance type:  Marijuana





Seasonal Allergies


Seasonal Allergies:  No





Surgeries


History of Surgeries:  Yes


Surgeries:  Orthopedic (left forearm)





Respiratory


History of Respiratory Disorde:  No





Cardiovascular


History of Cardiac Disorders:  No





Neurological


History of Neurological Disord:  No





Genitourinary


History of Genitourinary Disor:  No





Gastrointestinal


History of Gastrointestinal Di:  No





Musculoskeletal


History of Musculoskeletal Dis:  No





Endocrine


History of Endocrine Disorders:  No





HEENT


History of HEENT Disorders:  No





Cancer


History of Cancer:  No





Psychosocial


History of Psychiatric Problem:  No





Integumentary


History of Skin or Integumenta:  No





Blood Transfusions


History of Blood Disorders:  No





Family Medical History


Significant Family History:  No Pertinent Family Hx (denies HTN, HLD, cancer, 

heart disease, stroke in mother/father)





Review of Systems-General


Constitutional:  chills, diaphoresis, fever


Respiratory:  No cough, No dyspnea on exertion


Cardiovascular:  No chest pain


Gastrointestinal:  abdominal pain (RLQ); No diarrhea, No hematemesis; loss of a

ppetite; No melena; nausea, vomiting


Genitourinary:  No dysuria, No frequency, No hematuria


Skin:  other (left lower leg laceration)


Psychiatric/Neurological:  Denies Headache





Physical Exam-General Problems


Physical Exam


Vital Signs





Vital Signs - First Documented








 6/29/23





 14:09


 


Temp 37.0


 


Pulse 88


 


Resp 16


 


B/P (MAP) 154/88 (110)


 


Pulse Ox 98


 


O2 Delivery Room Air





Capillary Refill : Less Than 3 Seconds


General Appearance:  WD/WN, mild distress


HEENT:  PERRL/EOMI; No scleral icterus (R), No scleral icterus (L)


Neck:  non-tender, supple


Respiratory:  lungs clear, normal breath sounds, no respiratory distress, no 

accessory muscle use


Cardiovascular:  regular rate, rhythm, no edema, no murmur


Peripheral Pulses:  2+ Dorsalis Pedis (R), 2+ Left Dors-Pedis (L), 2+ Radial 

Pulses (R), 2+ Radial Pulses (L)


Gastrointestinal:  soft, guarding (voluntary), tenderness (diffuse abdominal 

tenderness, worst in the RLQ and right mid abdomen. positive McBurney's point 

tenderness, positive Rovsing's sign. positive heel tap sign.)


Extremities:  no pedal edema, other (sutured laceration to the left lateral 

calf)


Neurologic/Psychiatric:  alert, normal mood/affect, oriented x 3


Skin:  normal color, warm/dry


Lymphatic:  no adenopathy (cervical)





Data Review


Labs


Laboratory Tests


6/29/23 14:13: 


White Blood Count 12.1H, Red Blood Count 4.88, Hemoglobin 14.8, Hematocrit 43, 

Mean Corpuscular Volume 89, Mean Corpuscular Hemoglobin 30, Mean Corpuscular 

Hemoglobin Concent 34, Red Cell Distribution Width 11.7, Platelet Count 292, 

Mean Platelet Volume 9.7, Immature Granulocyte % (Auto) 0, Neutrophils (%) 

(Auto) 81H, Lymphocytes (%) (Auto) 11L, Monocytes (%) (Auto) 8, Eosinophils (%) 

(Auto) 1, Basophils (%) (Auto) 0, Neutrophils # (Auto) 9.8H, Lymphocytes # 

(Auto) 1.3, Monocytes # (Auto) 0.9, Eosinophils # (Auto) 0.1, Basophils # (Auto)

0.0, Immature Granulocyte # (Auto) 0.0, Sodium Level 135, Potassium Level 3.9, 

Chloride Level 98, Carbon Dioxide Level 26, Anion Gap 11, Blood Urea Nitrogen 

12, Creatinine 0.94, Estimat Glomerular Filtration Rate 114, BUN/Creatinine R

atio 13, Glucose Level 113H, Calcium Level 9.8, Corrected Calcium 9.5, Total 

Bilirubin 0.8, Aspartate Amino Transf (AST/SGOT) 57H, Alanine Aminotransferase 

(ALT/SGPT) 82H, Alkaline Phosphatase 112, Total Protein 8.2, Albumin 4.4, Lipase

22


6/29/23 14:20: 


Urine Color DARK YELLOW, Urine Clarity CLEAR, Urine pH 6.0, Urine Specific 

Gravity 1.025H, Urine Protein TRACEH, Urine Glucose (UA) NEGATIVE, Urine Ketones

NEGATIVE, Urine Nitrite NEGATIVE, Urine Bilirubin 1+H, Urine Urobilinogen 2.0, 

Urine Leukocyte Esterase NEGATIVE, Urine RBC (Auto) NEGATIVE, Urine RBC NONE, 

Urine WBC RARE, Urine Squamous Epithelial Cells RARE, Urine Crystals NONE, Urine

Bacteria NEGATIVE, Urine Casts NONE, Urine Mucus SMALLH, Urine Culture Indicated

NO





Assessment/Plan


Assessment/Plan


Admission Diagonsis


Perforated appendicitis


Assessment/Plan


1. Acute appendicitis


2. Right-sided abdominal pain


3. Leukocytosis





Patient has clinical presentation concerning for acute appendicitis, and CT 

taken prior to arrival showed perforated appendix. Patient will be taken to the 

OR for appendectomy.





SHREYAS BAEZ DO 6/29/23 1700:


History of Present Illness


History of Present Illness


Time Seen by Provider:  16:41


History of Present Illness


Surgery asked to consult regarding appendicitis.





HPI per ED: 27-year-old male presents emergency department today for abdominal 

pain.  Symptoms present for the last 2 or 3 days and have been constant.  He 

points to his right mid abdomen as the source of the pain.  No obvious 

aggravating or alleviating factors and no radiation.  He does endorse has had 

night sweats last couple nights and thinks he may have had fevers but did not 

actually check his temperature.  He did vomit once today and he has had 

decreased appetite for the last couple of days.  He has had normal bowel 

movements and no urinary symptoms.  No testicular or groin symptoms.  He was 

sent here from the [facility] clinic and I received verbal report prior to his arrival 

from the provider there.





When I spoke to pt he was still having RLQ pain, rated the pain as 8 out of 10. 

He vomited in the CT scanner.





Allergies and Home Medications


Allergies


Coded Allergies:  


     No Known Drug Allergies (Unverified , 5/20/20)





Patient Home Medication List


Home Medication List Reviewed:  Yes


Cephalexin (Keflex) 500 Mg Capsule, 500 MG PO Q6H


   Prescribed by: MAGALIS JOHNSON on 9/6/20 1728


Hydrocodone/Acetaminophen (Hydrocodone-Acetamin 5-325 mg) 1 Each Tablet, 1 EACH 

PO Q6H


   Prescribed by: MAGALIS JHONSON on 9/6/20 1728


Ibuprofen (Ibuprofen) 800 Mg Tablet, 800 MG PO Q8H PRN for PAIN


   Prescribed by: MAGALIS JOHNSON on 9/6/20 1728


Sulfamethoxazole/Trimethoprim (Bactrim Ds Tablet) 1 Each Tablet, 1 EACH PO BID


   Prescribed by: LIBAN SINHA on 5/20/20 1915


Sulfamethoxazole/Trimethoprim (Bactrim Ds Tablet) 1 Each Tablet, 1 EACH PO BID


   Prescribed by: MAGALIS JOHNSON on 9/6/20 1728





Past Medical-Social-Family Hx


Patient Social History


Smoking Status:  Current Everyday Smoker (0.5 ppd)


Alcohol Use?:  Yes


Substance type:  Marijuana





Surgeries


History of Surgeries:  Yes


Surgeries:  Orthopedic (left forearm)





Respiratory


History of Respiratory Disorde:  No





Cardiovascular


History of Cardiac Disorders:  No





Neurological


History of Neurological Disord:  No





Genitourinary


History of Genitourinary Disor:  No





Gastrointestinal


History of Gastrointestinal Di:  No





Musculoskeletal


History of Musculoskeletal Dis:  No





Endocrine


History of Endocrine Disorders:  No





HEENT


History of HEENT Disorders:  No


Loss of Vision:  Denies


Hearing Impairment:  Denies





Cancer


History of Cancer:  No





Psychosocial


History of Psychiatric Problem:  No





Integumentary


History of Skin or Integumenta:  Yes (laceration on left lower leg)





Family Medical History


Significant Family History:  Heart Disease (denied parents have any), Cancer 

(denies parents have any), Diabetes (denies parents have any)





Review of Systems-General


Constitutional:  chills, diaphoresis, fever


EENTM:  No hearing loss, No blurred vision, No epistaxis


Respiratory:  No cough, No dyspnea on exertion


Cardiovascular:  No chest pain, No palpitations


Gastrointestinal:  abdominal pain (RLQ); No diarrhea, No hematemesis; loss of 

appetite; No melena; nausea, vomiting


Genitourinary:  No dysuria, No frequency, No hematuria


Musculoskeletal:  No joint pain, No joint swelling, No muscle pain


Skin:  No change in color, No change in hair/nails; other (left lower leg 

laceration)


Psychiatric/Neurological:  Denies Anxiety, Denies Depressed, Denies Headache





Physical Exam-General Problems


Physical Exam


General Appearance:  WD/WN, mild distress (secondary to pain)


Eyes:  Bilateral Eye PERRL, Bilateral Eye EOMI


HEENT:  pharynx normal; No scleral icterus (R), No scleral icterus (L)


Neck:  non-tender, supple


Respiratory:  lungs clear, normal breath sounds, no respiratory distress, no a

ccessory muscle use


Cardiovascular:  regular rate, rhythm, no edema, no murmur


Gastrointestinal:  soft, no organomegaly, guarding (voluntary, RLQ), tenderness 

(diffuse abdominal tenderness, worst in the RLQ and right mid abdomen. positive 

McBurney's point tenderness, positive Rovsing's sign. positive heel tap sign.), 

hernia (small umbilical)


Rectal:  deferred


Back:  no CVA tenderness, no vertebral tenderness


Extremities:  no pedal edema, no calf tenderness, other (healing laceration on 

the shin (medial to lateral) black eschar and mild erythema around it)


Neurologic/Psychiatric:  alert, normal mood/affect, oriented x 3


Skin:  normal color, warm/dry


Lymphatic:  no adenopathy (cervical, axillary or inguinal)





Data Review


Radiology


Date of Exam:06/29/23





CT ABDOMEN/PELVIS W








PROCEDURE: CT abdomen and pelvis with contrast.





TECHNIQUE: Multiple contiguous axial images were obtained through


the abdomen and pelvis after administration of intravenous


contrast. Auto Exposure Controls were utilized during the CT exam


to meet ALARA standards for radiation dose reduction. All CT


scans use one or more of the following dose optimizing


techniques: automated exposure control, MA and/or KvP adjustment


based on patient size and exam type or iterative reconstruction.





INDICATION: Right lower quadrant abdominal pain.





FINDINGS: No focal hepatic, gallbladder, pancreatic, adrenal


gland or splenic abnormalities identified. Kidneys are also


unremarkable.





There is extensive dilatation and mural thickening of the


appendix located in a retrocecal location. Surrounding edema and


inflammation is also noted without evidence of organized fluid


collection. Otherwise, no focal inflammation or organized fluid


collection is seen within the abdomen or pelvis. There is


exostosis along the lateral aspect of the anterosuperior right


iliac bone without CT evidence of cartilaginous thickening. This


measures up to approximately 3.2 cm in diameter.





IMPRESSION: Findings are compatible with acute appendicitis with


periappendiceal inflammation. No drainable abscess is identified.





Incidental note is made of 3.2 cm external osteochondroma along


the anterosuperior right iliac bone.





  Dictated on workstation # [id number]








Dict:   06/29/23 [id number]


Trans:   06/29/23 1500


AS6 [id number]





Interpreted by:     MANISH ORTA MD





Assessment/Plan


1. Acute appendicitis


2. Right-sided abdominal pain


3. Leukocytosis





Patient has acute appendicitis; confirmed by CT.  I spoke with ED physiciain and

looked at the CT images myself; I am not sure I agree with perforated appendix. 

The appendix appears to be retrocecal and this may be masking some of 


his symptoms.  I discussed findings and my recommendations with pt; he needs 

surgery.  Patient will be taken to the OR for Laparoscopic appendectomy and all 

other indicated procedures (will get consent).  Will start IV fluids, will order


IV ABX on call to OR, pain meds and anti-emetics as needed.  He states he had 

two small bites of egg roll, but then vomited in the CT.  I did talk to him 

about risk of aspiration, but I believe his situation warrants going to surgery 

now; janessa


if the appendix is ruptured. He agrees and we went over risks and complications 

not limited to pain, bleeding, infection, scar damage to bowel and need for 

further procedure; all questions answered to his satisfaction.  We did also talk

about


possible drain placement and continued ABX depending on what we find during 

surgery.





Supervisory-Addendum Brief


Verification & Attestation


Participated in pt care:  history, MDM, physical


Personally performed:  exam, history, MDM, supervision of care


Care discussed with:  Medical Student


Procedures:  n/a


Verification and Attestation of Medical Student E/M Service





A medical student performed and documented this service. I then reviewed and 

verified all information documented by the medical student and made 

modifications to such information, when appropriate. I personally performed a p

hysical exam, medical decision making and then discussed any differences between

the notes and made revisions as necessary to create one note.





Shreyas Baez , 6/29/23 , 17:09











KIANA SHARIF                    Jun 29, 2023 16:30


SHREYAS BAEZ DO               Jun 29, 2023 17:00

## 2023-06-30 NOTE — ANESTHESIA-GENERAL POST-OP
General


Patient Condition


Mental Status/LOC:  Same as Preop


Cardiovascular:  Satisfactory


Nausea/Vomiting:  Absent


Respiratory:  Satisfactory


Pain:  Controlled


Complications:  Absent





Post Op Complications


Complications


None





Follow Up Care/Instructions


Patient Instructions


None needed.





Anesthesia/Patient Condition


Patient Condition


Patient is doing well, no complaints, stable vital signs, no apparent adverse 

anesthesia problems.   


No complications reported per nursing.











AMERICA NIEVES CRNA              Jun 30, 2023 14:27